# Patient Record
(demographics unavailable — no encounter records)

---

## 2025-02-13 NOTE — PROCEDURE
[Risk and Benefits Discussed] : The purpose, risks, discomforts, benefits and alternatives of the procedure have been explained to the patient including no treatment. [Same] : same as the Pre Op Dx. [] : Removal of FB: Ear Canal [FreeTextEntry4] : tetracaine  [FreeTextEntry6] : bug parts

## 2025-02-13 NOTE — HISTORY OF PRESENT ILLNESS
[de-identified] : 19 year old females presents an initial evaluation.  Patient went to ED @2am due to a bug being in her ear. ED attempted to remove the bug but couldn't get it all.